# Patient Record
Sex: FEMALE | Race: BLACK OR AFRICAN AMERICAN | NOT HISPANIC OR LATINO | URBAN - METROPOLITAN AREA
[De-identification: names, ages, dates, MRNs, and addresses within clinical notes are randomized per-mention and may not be internally consistent; named-entity substitution may affect disease eponyms.]

---

## 2022-04-01 ENCOUNTER — TELEPHONE ENCOUNTER (OUTPATIENT)
Dept: URBAN - METROPOLITAN AREA CLINIC 72 | Facility: CLINIC | Age: 42
End: 2022-04-01

## 2022-05-25 ENCOUNTER — WEB ENCOUNTER (OUTPATIENT)
Dept: URBAN - METROPOLITAN AREA CLINIC 107 | Facility: CLINIC | Age: 42
End: 2022-05-25

## 2022-05-27 ENCOUNTER — OFFICE VISIT (OUTPATIENT)
Dept: URBAN - METROPOLITAN AREA CLINIC 107 | Facility: CLINIC | Age: 42
End: 2022-05-27
Payer: OTHER GOVERNMENT

## 2022-05-27 ENCOUNTER — LAB OUTSIDE AN ENCOUNTER (OUTPATIENT)
Dept: URBAN - METROPOLITAN AREA CLINIC 107 | Facility: CLINIC | Age: 42
End: 2022-05-27

## 2022-05-27 ENCOUNTER — DASHBOARD ENCOUNTERS (OUTPATIENT)
Age: 42
End: 2022-05-27

## 2022-05-27 VITALS
DIASTOLIC BLOOD PRESSURE: 82 MMHG | WEIGHT: 202 LBS | HEIGHT: 68 IN | TEMPERATURE: 97.8 F | HEART RATE: 66 BPM | BODY MASS INDEX: 30.62 KG/M2 | RESPIRATION RATE: 18 BRPM | SYSTOLIC BLOOD PRESSURE: 131 MMHG

## 2022-05-27 DIAGNOSIS — R63.4 WEIGHT LOSS: ICD-10-CM

## 2022-05-27 DIAGNOSIS — R19.4 CHANGE IN BOWEL HABITS: ICD-10-CM

## 2022-05-27 DIAGNOSIS — K59.01 CONSTIPATION BY DELAYED COLONIC TRANSIT: ICD-10-CM

## 2022-05-27 DIAGNOSIS — R74.01 ELEVATED AST (SGOT): ICD-10-CM

## 2022-05-27 PROCEDURE — 99204 OFFICE O/P NEW MOD 45 MIN: CPT | Performed by: INTERNAL MEDICINE

## 2022-05-27 NOTE — HPI-TODAY'S VISIT:
The patient is a very pleasant 42-year-old female referred for constipation.  I have reviewed referring records.  Laboratory testing from March of this year reveals a normal CBC other than hematocrit of 44.  Normal BMP.  Slightly elevated AST at 45 but otherwise normal LFTs.  TSH normal at 1.5.  C-reactive protein normal at 0.9.  FRANCESCA elevated at 1-80.  Other autoimmune markers unremarkable. The patient states her problem has been in difficulty and having bowel movements with severe postprandial bloating that has been going on for the past year and a half.  She has been under a great deal of stress as she is getting  from her .  She is never had a colonoscopy.  Her grandmother was a well-known patient of mine who had early gastric cancer that we treated with endoscopic mucosal resection leading to cure but later passed from pancreatic adenocarcinoma.  There is no family history of colon cancer other than possibly maternal aunt.  Patient denies any blood in her stool.  She was using dairy products but outside of cheeses discontinued these.  She has felt somewhat better and led to some weight loss by going on a pure vegetarian diet and removing most dairy products other than cheese.  Though the problem of bloating and constipation continues.  She is currently using herbal teas on a daily basis to have a bowel movement and still does not give her a bowel movement every day.

## 2022-07-08 ENCOUNTER — OFFICE VISIT (OUTPATIENT)
Dept: URBAN - METROPOLITAN AREA SURGERY CENTER 25 | Facility: SURGERY CENTER | Age: 42
End: 2022-07-08
Payer: OTHER GOVERNMENT

## 2022-07-08 DIAGNOSIS — R19.4 CHANGE IN BOWEL HABITS: ICD-10-CM

## 2022-07-08 PROCEDURE — 45378 DIAGNOSTIC COLONOSCOPY: CPT | Performed by: INTERNAL MEDICINE

## 2022-07-08 PROCEDURE — G8907 PT DOC NO EVENTS ON DISCHARG: HCPCS | Performed by: INTERNAL MEDICINE

## 2022-08-24 ENCOUNTER — OFFICE VISIT (OUTPATIENT)
Dept: URBAN - METROPOLITAN AREA CLINIC 113 | Facility: CLINIC | Age: 42
End: 2022-08-24

## 2022-12-29 ENCOUNTER — OFFICE VISIT (OUTPATIENT)
Dept: URBAN - METROPOLITAN AREA CLINIC 113 | Facility: CLINIC | Age: 42
End: 2022-12-29

## 2022-12-29 PROBLEM — 35298007: Status: ACTIVE | Noted: 2022-05-27

## 2022-12-29 NOTE — HPI-TODAY'S VISIT:
The patient is a very pleasant 42-year-old female referred for constipation.  I have reviewed referring records.  Laboratory testing from March of this year reveals a normal CBC other than hematocrit of 44.  Normal BMP.  Slightly elevated AST at 45 but otherwise normal LFTs.  TSH normal at 1.5.  C-reactive protein normal at 0.9.  FRANCESCA elevated at 1-80.  Other autoimmune markers unremarkable. The patient states her problem has been in difficulty and having bowel movements with severe postprandial bloating that has been going on for the past year and a half.  She has been under a great deal of stress as she is getting  from her .  She is never had a colonoscopy.  Her grandmother was a well-known patient of mine who had early gastric cancer that we treated with endoscopic mucosal resection leading to cure but later passed from pancreatic adenocarcinoma.  There is no family history of colon cancer other than possibly maternal aunt.  Patient denies any blood in her stool.  She was using dairy products but outside of cheeses discontinued these.  She has felt somewhat better and led to some weight loss by going on a pure vegetarian diet and removing most dairy products other than cheese.  Though the problem of bloating and constipation continues.  She is currently using herbal teas on a daily basis to have a bowel movement and still does not give her a bowel movement every day. Interval history, 12/29/2023.  Colonoscopy performed July 8 of this year was normal.  There was an excellent prep.  The exam was performed without difficulty.  Patient was recommended a 10-year follow-up. Laboratory testing performed in November of this year revealed a normal CBC and normal CMP.  TSH was normal.  Hemoglobin A1c was 5.4.  In particular AST was normal at 16.

## 2023-01-03 ENCOUNTER — OFFICE VISIT (OUTPATIENT)
Dept: URBAN - METROPOLITAN AREA CLINIC 113 | Facility: CLINIC | Age: 43
End: 2023-01-03

## 2023-01-03 NOTE — HPI-TODAY'S VISIT:
The patient is a very pleasant 42-year-old female referred for constipation.  I have reviewed referring records.  Laboratory testing from March of this year reveals a normal CBC other than hematocrit of 44.  Normal BMP.  Slightly elevated AST at 45 but otherwise normal LFTs.  TSH normal at 1.5.  C-reactive protein normal at 0.9.  FRANCESCA elevated at 1-80.  Other autoimmune markers unremarkable. The patient states her problem has been in difficulty and having bowel movements with severe postprandial bloating that has been going on for the past year and a half.  She has been under a great deal of stress as she is getting  from her .  She is never had a colonoscopy.  Her grandmother was a well-known patient of mine who had early gastric cancer that we treated with endoscopic mucosal resection leading to cure but later passed from pancreatic adenocarcinoma.  There is no family history of colon cancer other than possibly maternal aunt.  Patient denies any blood in her stool.  She was using dairy products but outside of cheeses discontinued these.  She has felt somewhat better and led to some weight loss by going on a pure vegetarian diet and removing most dairy products other than cheese.  Though the problem of bloating and constipation continues.  She is currently using herbal teas on a daily basis to have a bowel movement and still does not give her a bowel movement every day. Interval history, 12/29/2023.  Colonoscopy performed July 8 of this year was normal.  There was an excellent prep.  The exam was performed without difficulty.  Patient was recommended a 10-year follow-up. Laboratory testing performed in November of this year revealed a normal CBC and normal CMP.  TSH was normal.  Hemoglobin A1c was 5.4.  In particular AST was normal at 16.  Interval history: 42-year-old female presents for follow-up after colonoscopy.  She was last seen on 5/27/2022.  She reported a change in bowel habits with constipation predominance.  Given her strong family history of malignancy she was recommended a colonoscopy.  She was to begin MiraLAX with titration to effect.  She did have an elevated AST which we would recheck on return.  If still elevated would plan for further work-up to include imaging, hepatitis serologies and autoimmune testing. Colonoscopy 7/8/2022:Performed without difficulty.  BB PS score of 9.  TI and colon are normal.  No specimens collected.  Repeat colonoscopy in 10 years for screening. Labs 11/1/2022:Unremarkable CBC, unremarkable CMP, normal TSH, hemoglobin A1c 5.4.